# Patient Record
Sex: FEMALE | Race: WHITE | NOT HISPANIC OR LATINO | URBAN - METROPOLITAN AREA
[De-identification: names, ages, dates, MRNs, and addresses within clinical notes are randomized per-mention and may not be internally consistent; named-entity substitution may affect disease eponyms.]

---

## 2020-05-22 ENCOUNTER — APPOINTMENT (OUTPATIENT)
Dept: URBAN - METROPOLITAN AREA CLINIC 200 | Age: 20
Setting detail: DERMATOLOGY
End: 2020-05-28

## 2020-05-22 DIAGNOSIS — L70.5 ACNÉ EXCORIÉE: ICD-10-CM

## 2020-05-22 PROCEDURE — OTHER TELEHEALTH ASSESSMENT: OTHER

## 2020-05-22 PROCEDURE — OTHER CONSENT FOR TELEMEDICINE VISIT OBTAINED: OTHER

## 2020-05-22 PROCEDURE — 99201: CPT | Mod: 95

## 2020-05-22 PROCEDURE — OTHER REASON FOR TELEMEDICINE VISIT: OTHER

## 2020-05-22 PROCEDURE — OTHER PRESCRIPTION: OTHER

## 2020-05-22 RX ORDER — DAPSONE 50 MG/G
GEL TOPICAL
Qty: 1 | Refills: 6 | Status: ERX | COMMUNITY
Start: 2020-05-22

## 2020-05-22 ASSESSMENT — LOCATION DETAILED DESCRIPTION DERM
LOCATION DETAILED: RIGHT LATERAL FOREHEAD
LOCATION DETAILED: RIGHT MEDIAL FOREHEAD
LOCATION DETAILED: LEFT INFERIOR CENTRAL MALAR CHEEK

## 2020-05-22 ASSESSMENT — LOCATION SIMPLE DESCRIPTION DERM
LOCATION SIMPLE: LEFT CHEEK
LOCATION SIMPLE: RIGHT FOREHEAD

## 2020-05-22 ASSESSMENT — LOCATION ZONE DERM: LOCATION ZONE: FACE

## 2020-11-20 ENCOUNTER — RX ONLY (RX ONLY)
Age: 20
End: 2020-11-20

## 2020-11-20 RX ORDER — TRIMETHOPRIM 100 MG/1
TABLET ORAL
Qty: 30 | Refills: 3 | Status: ERX

## 2021-09-15 ENCOUNTER — APPOINTMENT (OUTPATIENT)
Dept: URBAN - METROPOLITAN AREA CLINIC 200 | Age: 21
Setting detail: DERMATOLOGY
End: 2021-09-15

## 2021-09-15 ENCOUNTER — RX ONLY (RX ONLY)
Age: 21
End: 2021-09-15

## 2021-09-15 DIAGNOSIS — L70.0 ACNE VULGARIS: ICD-10-CM

## 2021-09-15 DIAGNOSIS — Z11.52 ENCOUNTER FOR SCREENING FOR COVID-19: ICD-10-CM

## 2021-09-15 PROCEDURE — OTHER PRESCRIPTION MEDICATION MANAGEMENT: OTHER

## 2021-09-15 PROCEDURE — OTHER SCREENING FOR COVID-19: OTHER

## 2021-09-15 PROCEDURE — 99213 OFFICE O/P EST LOW 20 MIN: CPT

## 2021-09-15 PROCEDURE — OTHER MIPS QUALITY: OTHER

## 2021-09-15 PROCEDURE — OTHER PRESCRIPTION: OTHER

## 2021-09-15 RX ORDER — DAPSONE 50 MG/G
GEL TOPICAL
Qty: 60 | Refills: 5 | Status: ERX | COMMUNITY
Start: 2021-09-15

## 2021-09-15 RX ORDER — TRIMETHOPRIM 100 MG/1
TABLET ORAL
Qty: 30 | Refills: 5 | Status: CANCELLED | COMMUNITY
Start: 2021-09-15

## 2021-09-15 RX ORDER — TRIMETHOPRIM 100 MG/1
TABLET ORAL
Qty: 30 | Refills: 5 | Status: ERX

## 2021-09-15 ASSESSMENT — LOCATION DETAILED DESCRIPTION DERM
LOCATION DETAILED: RIGHT CHIN
LOCATION DETAILED: PERIUMBILICAL SKIN
LOCATION DETAILED: LEFT CENTRAL MALAR CHEEK
LOCATION DETAILED: RIGHT CENTRAL MALAR CHEEK
LOCATION DETAILED: LEFT MEDIAL FOREHEAD

## 2021-09-15 ASSESSMENT — LOCATION SIMPLE DESCRIPTION DERM
LOCATION SIMPLE: LEFT CHEEK
LOCATION SIMPLE: ABDOMEN
LOCATION SIMPLE: RIGHT CHEEK
LOCATION SIMPLE: CHIN
LOCATION SIMPLE: LEFT FOREHEAD

## 2021-09-15 ASSESSMENT — SEVERITY ASSESSMENT OVERALL AMONG ALL PATIENTS
IN YOUR EXPERIENCE, AMONG ALL PATIENTS YOU HAVE SEEN WITH THIS CONDITION, HOW SEVERE IS THIS PATIENT'S CONDITION?: ALMOST CLEAR

## 2021-09-15 ASSESSMENT — LOCATION ZONE DERM
LOCATION ZONE: TRUNK
LOCATION ZONE: FACE

## 2021-09-15 NOTE — PROCEDURE: SCREENING FOR COVID-19
Has The Patient Been Infected With Covid-19 In The Past?: No
Previous Infection Text: The patient has recovered from a previous COVID-19 infection.
Detail Level: Generalized
PI# [id number]

## 2021-09-15 NOTE — PROCEDURE: PRESCRIPTION MEDICATION MANAGEMENT
Render In Strict Bullet Format?: No
Continue Regimen: Dapsone \\nTrimethoprim
Detail Level: Detailed

## 2021-09-15 NOTE — PROCEDURE: MIPS QUALITY
Additional Notes: Patient received verbal cessation counseling (3 min or less) and hand out was given to the [facility] smoking cessation counseling program. Additional Notes: Patient received verbal cessation counseling (3 min or less) and hand out was given to the [facility] smoking cessation counseling program.